# Patient Record
Sex: FEMALE | Race: WHITE | NOT HISPANIC OR LATINO | URBAN - METROPOLITAN AREA
[De-identification: names, ages, dates, MRNs, and addresses within clinical notes are randomized per-mention and may not be internally consistent; named-entity substitution may affect disease eponyms.]

---

## 2024-11-13 VITALS
WEIGHT: 165.79 LBS | TEMPERATURE: 97 F | HEART RATE: 60 BPM | OXYGEN SATURATION: 99 % | HEIGHT: 62 IN | SYSTOLIC BLOOD PRESSURE: 114 MMHG | RESPIRATION RATE: 16 BRPM | DIASTOLIC BLOOD PRESSURE: 69 MMHG

## 2024-11-13 RX ORDER — NADOLOL 40 MG
2 TABLET ORAL
Refills: 0 | DISCHARGE

## 2024-11-13 NOTE — ASU PREOP CHECKLIST - AS BP NONINV METHOD
Refill Request received from [facility] for: Aspirin EC 81 MG Tab - Med not listed in chart. electronic

## 2024-11-13 NOTE — ASU PREOP CHECKLIST - SELECT TESTS ORDERED
CBC/CMP/PT/PTT/INR/Urinalysis ucg/CBC/CMP/PT/PTT/INR/Urinalysis ucg negative/CBC/CMP/PT/PTT/INR/Urinalysis/EKG

## 2024-11-13 NOTE — ASU PATIENT PROFILE, ADULT - FALL HARM RISK - UNIVERSAL INTERVENTIONS
Bed in lowest position, wheels locked, appropriate side rails in place/Call bell, personal items and telephone in reach/Instruct patient to call for assistance before getting out of bed or chair/Non-slip footwear when patient is out of bed/[place] to call system/Physically safe environment - no spills, clutter or unnecessary equipment/Purposeful Proactive Rounding/Room/bathroom lighting operational, light cord in reach

## 2024-11-13 NOTE — ASU PATIENT PROFILE, ADULT - NSICDXPASTMEDICALHX_GEN_ALL_CORE_FT
PAST MEDICAL HISTORY:  Endometriosis     Long QT syndrome      PAST MEDICAL HISTORY:  Endometriosis     Long QT syndrome     Psoriatic arthritis

## 2024-11-14 ENCOUNTER — OUTPATIENT (OUTPATIENT)
Dept: OUTPATIENT SERVICES | Facility: HOSPITAL | Age: 34
LOS: 1 days | Discharge: HOME | End: 2024-11-14
Payer: MEDICAID

## 2024-11-14 VITALS
HEART RATE: 68 BPM | SYSTOLIC BLOOD PRESSURE: 97 MMHG | DIASTOLIC BLOOD PRESSURE: 59 MMHG | OXYGEN SATURATION: 97 % | RESPIRATION RATE: 16 BRPM

## 2024-11-14 PROCEDURE — 86900 BLOOD TYPING SEROLOGIC ABO: CPT

## 2024-11-14 PROCEDURE — 86901 BLOOD TYPING SEROLOGIC RH(D): CPT

## 2024-11-14 PROCEDURE — C9399: CPT

## 2024-11-14 PROCEDURE — 88360 TUMOR IMMUNOHISTOCHEM/MANUAL: CPT

## 2024-11-14 PROCEDURE — 88305 TISSUE EXAM BY PATHOLOGIST: CPT

## 2024-11-14 PROCEDURE — 44970 LAPAROSCOPY APPENDECTOMY: CPT | Mod: AS,59

## 2024-11-14 PROCEDURE — 88342 IMHCHEM/IMCYTCHM 1ST ANTB: CPT | Mod: 26

## 2024-11-14 PROCEDURE — 88302 TISSUE EXAM BY PATHOLOGIST: CPT | Mod: 26

## 2024-11-14 PROCEDURE — 88302 TISSUE EXAM BY PATHOLOGIST: CPT

## 2024-11-14 PROCEDURE — 44970 LAPAROSCOPY APPENDECTOMY: CPT | Mod: XS

## 2024-11-14 PROCEDURE — 88342 IMHCHEM/IMCYTCHM 1ST ANTB: CPT

## 2024-11-14 PROCEDURE — 88305 TISSUE EXAM BY PATHOLOGIST: CPT | Mod: 26

## 2024-11-14 PROCEDURE — 88341 IMHCHEM/IMCYTCHM EA ADD ANTB: CPT

## 2024-11-14 PROCEDURE — 58662 LAPAROSCOPY EXCISE LESIONS: CPT | Mod: AS

## 2024-11-14 PROCEDURE — C1889: CPT

## 2024-11-14 PROCEDURE — 88341 IMHCHEM/IMCYTCHM EA ADD ANTB: CPT | Mod: 26

## 2024-11-14 PROCEDURE — 58662 LAPAROSCOPY EXCISE LESIONS: CPT

## 2024-11-14 PROCEDURE — 86850 RBC ANTIBODY SCREEN: CPT

## 2024-11-14 PROCEDURE — S2900: CPT

## 2024-11-14 DEVICE — VISTASEAL FIBRIN HUMAN 10ML: Type: IMPLANTABLE DEVICE | Status: FUNCTIONAL

## 2024-11-14 RX ORDER — SCOPOLAMINE 1 MG/3D
1 PATCH, EXTENDED RELEASE TRANSDERMAL ONCE
Refills: 0 | Status: COMPLETED | OUTPATIENT
Start: 2024-11-14 | End: 2024-11-14

## 2024-11-14 RX ORDER — ACETAMINOPHEN 500 MG
1000 TABLET ORAL EVERY 6 HOURS
Refills: 0 | Status: DISCONTINUED | OUTPATIENT
Start: 2024-11-14 | End: 2024-11-14

## 2024-11-14 RX ORDER — OXYCODONE HYDROCHLORIDE 30 MG/1
5 TABLET ORAL EVERY 4 HOURS
Refills: 0 | Status: DISCONTINUED | OUTPATIENT
Start: 2024-11-14 | End: 2024-11-14

## 2024-11-14 RX ORDER — HYDROMORPHONE HCL/0.9% NACL/PF 6 MG/30 ML
0.5 PATIENT CONTROLLED ANALGESIA SYRINGE INTRAVENOUS
Refills: 0 | Status: DISCONTINUED | OUTPATIENT
Start: 2024-11-14 | End: 2024-11-14

## 2024-11-14 RX ORDER — SIMETHICONE 80 MG/1
80 TABLET, CHEWABLE ORAL EVERY 6 HOURS
Refills: 0 | Status: DISCONTINUED | OUTPATIENT
Start: 2024-11-14 | End: 2024-11-14

## 2024-11-14 RX ORDER — NADOLOL 40 MG
1 TABLET ORAL
Refills: 0 | DISCHARGE

## 2024-11-14 RX ORDER — APREPITANT 40 MG/1
40 CAPSULE ORAL ONCE
Refills: 0 | Status: COMPLETED | OUTPATIENT
Start: 2024-11-14 | End: 2024-11-14

## 2024-11-14 RX ORDER — OXYCODONE HYDROCHLORIDE 30 MG/1
1 TABLET ORAL
Qty: 5 | Refills: 0
Start: 2024-11-14

## 2024-11-14 RX ORDER — PANTOPRAZOLE SODIUM 40 MG/1
20 TABLET, DELAYED RELEASE ORAL DAILY
Refills: 0 | Status: DISCONTINUED | OUTPATIENT
Start: 2024-11-14 | End: 2024-11-14

## 2024-11-14 RX ORDER — ACETAMINOPHEN 500 MG
1000 TABLET ORAL ONCE
Refills: 0 | Status: COMPLETED | OUTPATIENT
Start: 2024-11-14 | End: 2024-11-14

## 2024-11-14 RX ORDER — HEPARIN SODIUM 10000 [USP'U]/ML
5000 INJECTION INTRAVENOUS; SUBCUTANEOUS ONCE
Refills: 0 | Status: COMPLETED | OUTPATIENT
Start: 2024-11-14 | End: 2024-11-14

## 2024-11-14 RX ORDER — OXYCODONE HYDROCHLORIDE 30 MG/1
1 TABLET ORAL
Qty: 10 | Refills: 0
Start: 2024-11-14

## 2024-11-14 RX ORDER — CELECOXIB 100 MG
400 CAPSULE ORAL ONCE
Refills: 0 | Status: COMPLETED | OUTPATIENT
Start: 2024-11-14 | End: 2024-11-14

## 2024-11-14 RX ADMIN — SIMETHICONE 80 MILLIGRAM(S): 80 TABLET, CHEWABLE ORAL at 14:27

## 2024-11-14 RX ADMIN — APREPITANT 40 MILLIGRAM(S): 40 CAPSULE ORAL at 10:49

## 2024-11-14 RX ADMIN — Medication 0.5 MILLIGRAM(S): at 11:01

## 2024-11-14 RX ADMIN — HEPARIN SODIUM 5000 UNIT(S): 10000 INJECTION INTRAVENOUS; SUBCUTANEOUS at 07:15

## 2024-11-14 RX ADMIN — Medication 0.5 MILLIGRAM(S): at 11:16

## 2024-11-14 RX ADMIN — Medication 1000 MILLIGRAM(S): at 13:23

## 2024-11-14 RX ADMIN — SCOPOLAMINE 1 PATCH: 1 PATCH, EXTENDED RELEASE TRANSDERMAL at 10:49

## 2024-11-14 RX ADMIN — Medication 0.5 MILLIGRAM(S): at 11:36

## 2024-11-14 RX ADMIN — PANTOPRAZOLE SODIUM 20 MILLIGRAM(S): 40 TABLET, DELAYED RELEASE ORAL at 11:36

## 2024-11-14 RX ADMIN — Medication 1000 MILLIGRAM(S): at 07:15

## 2024-11-14 RX ADMIN — Medication 0.5 MILLIGRAM(S): at 14:25

## 2024-11-14 RX ADMIN — Medication 0.5 MILLIGRAM(S): at 11:51

## 2024-11-14 RX ADMIN — Medication 400 MILLIGRAM(S): at 07:15

## 2024-11-14 RX ADMIN — Medication 0.5 MILLIGRAM(S): at 14:28

## 2024-11-14 NOTE — ASU DISCHARGE PLAN (ADULT/PEDIATRIC) - FINANCIAL ASSISTANCE
[facility] provides services at a reduced cost to those who are determined to be eligible through [facility]’s financial assistance program. Information regarding [facility]’s financial assistance program can be found by going to [email or web address] or by calling [phone].

## 2024-11-14 NOTE — PROGRESS NOTE ADULT - SUBJECTIVE AND OBJECTIVE BOX
GYN POC   Patient seen at bedside. Pain controlled. Tolerating sips. OOB and ambulated to the bathroom and voiding.   Denies CP, palpitations, SOB, fever, chills, nausea, vomiting.    Vital Signs Last 24 Hrs  T(C): 36.8 (14 Nov 2024 10:30), Max: 36.8 (14 Nov 2024 10:30)  T(F): 98.2 (14 Nov 2024 10:30), Max: 98.2 (14 Nov 2024 10:30)  HR: 76 (14 Nov 2024 14:00) (58 - 80)  BP: 115/62 (14 Nov 2024 14:00) (92/52 - 115/62)  BP(mean): 75 (14 Nov 2024 14:00) (66 - 77)  RR: 15 (14 Nov 2024 14:00) (12 - 20)  SpO2: 97% (14 Nov 2024 14:00) (96% - 100%)    Parameters below as of 14 Nov 2024 14:00  Patient On (Oxygen Delivery Method): room air        Physical Exam:  Gen: No Acute Distress  Pulm: Clear to auscultation bilaterally  GI: soft, appropriately tender, nondistended, decreased BS, no rebound, no guarding.  Dressing C/D/I    I&O's Summary    14 Nov 2024 07:01  -  14 Nov 2024 15:14  --------------------------------------------------------  IN: 909 mL / OUT: 0 mL / NET: 909 mL      MEDICATIONS  (STANDING):  acetaminophen     Tablet .. 1000 milliGRAM(s) Oral every 6 hours  lactated ringers. 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

## 2024-11-14 NOTE — ASU DISCHARGE PLAN (ADULT/PEDIATRIC) - ASU DC SPECIAL INSTRUCTIONSFT
Void prior to discharge.  Please  prescription with script that was given to you by the doctor.  Nothing per vagina for 4 weeks and no exercise or heavy lifting until cleared by Dr. Santos  You may shower in 48 hours. Pat incisions dry, do not scrub. Leave steri strips in place.  Please call the doctor's office for follow up appointment.

## 2024-11-14 NOTE — PROGRESS NOTE ADULT - ASSESSMENT
33yo s/p hx of endometriosis RA endo excision, L ovarian cystectomy and appendectomy. Pain well controlled. VSS.  [] stable for discharge home

## 2024-11-14 NOTE — PRE-ANESTHESIA EVALUATION ADULT - NSPROPOSEDPROCEDFT_GEN_ALL_CORE
RObotic assisted laparascopic excision of Endometriosis. Left ovarian cystectomy possible appendectomy.  possible unilateral salpingectomy

## 2024-11-14 NOTE — ASU DISCHARGE PLAN (ADULT/PEDIATRIC) - CARE PROVIDER_API CALL
Colleen Santos Ibis  Obstetrics and Gynecology  [address], [address]  [place] [address]  Phone: [phone]  Fax: [phone]  Follow Up Time: 2 weeks

## 2024-11-14 NOTE — BRIEF OPERATIVE NOTE - OPERATION/FINDINGS
endometriosis. left ovarian endometrioma. appendectomy completed with 2 vicryl endoloops. Ovarian defect closed with 3-0 stratafix. Bladder and rectum oversewn with 2-0 vicryl. Vistaseal and amniofix placed. Skin closed with 4-0 monocryl and steri strips

## 2024-11-14 NOTE — BRIEF OPERATIVE NOTE - ESTIMATED BLOOD LOSS
"Chief Complaint   Patient presents with     URI       Initial BP 98/62 (BP Location: Left arm, Patient Position: Sitting, Cuff Size: Adult Regular)   Pulse 97   Temp 98.8  F (37.1  C) (Tympanic)   Wt 52.2 kg (115 lb)   SpO2 98%  Estimated body mass index is 26.03 kg/m  as calculated from the following:    Height as of 9/16/19: 1.403 m (4' 7.25\").    Weight as of 9/16/19: 51.3 kg (113 lb).  Medication Reconciliation: complete  Miki Najera LPN  " 10

## 2024-11-14 NOTE — BRIEF OPERATIVE NOTE - NSICDXBRIEFPROCEDURE_GEN_ALL_CORE_FT
PROCEDURES:  Robot-assisted fulguration of endometriosis 14-Nov-2024 10:12:40  Kaitlin Helton  Robot-assisted laparoscopic left ovarian cystectomy 14-Nov-2024 10:12:51  Kaitlin Helton  Robot-assisted appendectomy 14-Nov-2024 10:13:10  Kaitlin Helton

## (undated) DEVICE — GLV 6.5 PROTEXIS (WHITE)

## (undated) DEVICE — XI ARM FORCEP PROGRASP 8MM

## (undated) DEVICE — Device

## (undated) DEVICE — NDL GRASPING DISP

## (undated) DEVICE — XI TIP COVER

## (undated) DEVICE — FOLEY TRAY 16FR 5CC LF UMETER CLOSED

## (undated) DEVICE — DRAPE IOBAN 23" X 23"

## (undated) DEVICE — TUBING AIRSEAL TRI-LUMEN FILTERED

## (undated) DEVICE — SUT MONOCRYL 4-0 27" PS-2 UNDYED

## (undated) DEVICE — D HELP - CLEARVIEW CLEARIFY SYSTEM

## (undated) DEVICE — INSUFFLATION NDL COVIDIEN SURGINEEDLE VERESS 120MM

## (undated) DEVICE — XI DRAPE COLUMN

## (undated) DEVICE — TUBING TUR 2 PRONG

## (undated) DEVICE — TROCAR SURGIQUEST AIRSEAL 5MM X 100MM

## (undated) DEVICE — APPLICATOR VISTASEAL LAP DUAL 45CM FLEX

## (undated) DEVICE — SUT VICRYL 3-0 27" SH

## (undated) DEVICE — STAPLER SKIN PROXIMATE

## (undated) DEVICE — XI ARM GRASPER BIPOLAR LONG 8MM

## (undated) DEVICE — GLV 6 PROTEXIS (WHITE)

## (undated) DEVICE — XI SEAL UNIVERSIAL 5-12MM

## (undated) DEVICE — DRAPE INSTRUMENT POUCH 10" X 18"

## (undated) DEVICE — XI ARM NEEDLE DRIVER SUTURECUT MEGA 8MM

## (undated) DEVICE — SUT VICRYL 0 18" ENDOLOOP LIGATURE

## (undated) DEVICE — XI ARM SCISSOR MONO CURVED

## (undated) DEVICE — NDL HYPO SAFE 22G X 1.5" (BLACK)

## (undated) DEVICE — XI DRAPE ARM

## (undated) DEVICE — DRSG STERISTRIPS 0.5 X 4"

## (undated) DEVICE — POSITIONER PINK PAD PIGAZZI SYSTEM XL W ARM PROTECTOR

## (undated) DEVICE — SUT VICRYL 0 27" UR-6

## (undated) DEVICE — INZII RETRIEVAL SYSTEM 5MM

## (undated) DEVICE — XI OBTURATOR OPTICAL BLADELESS 8MM